# Patient Record
(demographics unavailable — no encounter records)

---

## 2024-10-31 NOTE — ADDENDUM
[FreeTextEntry1] : doing well cont current bp/lipid regimen for cac score for further risk stratification

## 2024-10-31 NOTE — CARDIOLOGY SUMMARY
[de-identified] : sinus rhythm  [de-identified] : 3/2023 pharm normal perfusion [de-identified] : 3/2023 LVEF 58%, nml LV/Rv, grade 1 diastolic, trace MR

## 2024-10-31 NOTE — CARDIOLOGY SUMMARY
[de-identified] : sinus rhythm  [de-identified] : 3/2023 pharm normal perfusion [de-identified] : 3/2023 LVEF 58%, nml LV/Rv, grade 1 diastolic, trace MR

## 2024-10-31 NOTE — DISCUSSION/SUMMARY
[EKG obtained to assist in diagnosis and management of assessed problem(s)] : EKG obtained to assist in diagnosis and management of assessed problem(s) [FreeTextEntry1] : Viraj has a history of hypertension.  He arrives for routine follow up and management of HTN. He has been feeling well, without concerning symptoms.  His EKG demonstrates a sinus rhythm, without worrisome ischemia or chamber enlargement.  His blood pressure is at goal, and physical exam is unremarkable.  Non invasive cardiac testing performed on 3/2023 including echocardiogram and nuclear stress testing were unremarkable.   He will remain on his current dose of amlodipine 5mg daily.  He is now on lipid management with lipitor 20mg daily.  I will have his most recent B/W including lipid panel forwarded to the office for review.  I have advises he undergo CT calcium score for further risk stratification in the setting of elevated LDLc.  I stressed the importance of diet, exercise, and weight loss, to reduce his overall cardiovascular risk.   We will speak after the above testing.  If all is well, he will follow up again in one year, sooner as needed.

## 2024-10-31 NOTE — HISTORY OF PRESENT ILLNESS
[FreeTextEntry1] : Viraj is a 66-year-old male here for follow up evaluation.  He has a history of hypertension, and is currently on amlodipine 5 mg.  He went to the emergency room last week, with some chest tightness, radiating to his neck and left arm.  There, evaluation, including blood work, was unremarkable.  Today, he is here for follow-up.  He works as a , and is quite busy running around.  He has had no additional chest pain, though believes it is from gas.  He had the symptoms in the past, and it seemed to get better with gas medication.   He had a echocardiogram and nuclear stress test in 2023 that was unremarkable, noting nml LVEF   He has no dyspnea on exertion, palpitations, lower extremity swelling, orthopnea, PND, dizziness, lightheadedness, or near syncope.  He denies toxic habits, and does not have a family history of CAD.